# Patient Record
(demographics unavailable — no encounter records)

---

## 2018-08-27 NOTE — PDOC
*Physical Exam





- Vital Signs


 Last Vital Signs











Temp Pulse Resp BP Pulse Ox


 


 100.1 F H  120 H  18   123/67   100 


 


 08/27/18 15:54  08/27/18 15:54  08/27/18 15:54  08/27/18 15:54  08/27/18 15:54














ED Treatment Course





- LABORATORY


CBC & Chemistry Diagram: 


 08/27/18 16:07





 08/27/18 16:07





- ADDITIONAL ORDERS


Additional order review: 


 Laboratory  Results











  08/27/18 08/27/18





  17:06 16:07


 


Sodium   140


 


Potassium   4.1


 


Chloride   105


 


Carbon Dioxide   26


 


Anion Gap   9


 


BUN   7


 


Creatinine   0.6


 


Creat Clearance w eGFR   No Result Required.


 


Random Glucose   115 H


 


Calcium   9.8


 


Total Bilirubin   0.6


 


AST   19


 


ALT   27


 


Alkaline Phosphatase   175 H


 


Total Protein   8.0


 


Albumin   3.9


 


Urine Color  Straw 


 


Urine Appearance  Clear 


 


Urine pH  6.0 


 


Ur Specific Gravity  1.003 


 


Urine Protein  Negative 


 


Urine Glucose (UA)  Negative 


 


Urine Ketones  Negative 


 


Urine Blood  3+ H 


 


Urine Nitrite  Negative 


 


Urine Bilirubin  Negative 


 


Urine Urobilinogen  Negative 


 


Ur Leukocyte Esterase  Negative 


 


Urine WBC (Auto)  2 


 


Urine RBC (Auto)  1 


 


Ur Epithelial Cells  Rare 


 


Urine HCG, Qual  Negative 








 











  08/27/18





  16:07


 


RBC  4.69


 


MCV  78.5


 


MCHC  33.0


 


RDW  16.2 H


 


MPV  8.3


 


Neutrophils %  84.9 H


 


Lymphocytes %  10.6


 


Monocytes %  4.1


 


Eosinophils %  0.2


 


Basophils %  0.2














- Medications


Given in the ED: 


ED Medications














Discontinued Medications














Generic Name Dose Route Start Last Admin





  Trade Name Ronnieq  PRN Reason Stop Dose Admin


 


Acetaminophen  650 mg  08/27/18 17:18  08/27/18 18:32





  Tylenol -  PO  08/27/18 17:19  650 mg





  ONCE ONE   Administration





     





     





     





     


 


Sodium Chloride  1,000 mls @ 1,000 mls/hr  08/27/18 17:17  08/27/18 18:32





  Normal Saline -  IV  08/27/18 18:16  Not Given





  ASDIR STA   





     





     





     





     














Progress Note





- Progress Note


Progress Note: 





Received sign out from BRIE Bruno.





Briefly this is a 13-year-old girl without significant medical history with 

acute lower back pain. All laboratory testing to this point has been normal and 

patient has been medicated. Plan is to reassess pain with probable discharge.





Patient is currently with a pain level 2/10. As laboratory testing is normal at 

discharge the patient home. All laboratory findings have been discussed with 

the child and the child's mother verbalized understanding of instructions. 

Mother states the child recently moved here from [place] and is in need of 

her pediatrician. I will give the child referral for Dr. Lockett.





*DC/Admit/Observation/Transfer


Diagnosis at time of Disposition: 


 Viral URI





Back pain


Qualifiers:


 Back pain location: low back pain Chronicity: acute Back pain laterality: 

midline Sciatica presence: without sciatica Qualified Code(s): M54.5 - Low back 

pain








- Discharge Dispostion


Disposition: HOME


Condition at time of disposition: Stable


Decision to Admit order: No





- Prescriptions


Prescriptions: 


Ibuprofen Oral Suspension [Motrin Oral Suspension -] 500 mg PO Q6H #140 ml





- Referrals


Referrals: 


Trace Neves MD [Staff Physician] - 





- Patient Instructions


Printed Discharge Instructions:  DI for Low Back Pain, DI for Viral Upper 

Respiratory Infection-Child


Additional Instructions: 


The cause of your child's back pain is most likely muscular.  Her labs and 

urine did not show any signs of infection.


Administer Motrin as directed for pain and/or fever, rest and hydrate as 

discussed in ED.


If symptoms worsen, return to the ER, otherwise follow-up with your pediatrician





- Post Discharge Activity

## 2018-08-27 NOTE — PDOC
Rapid Medical Evaluation


Chief Complaint: Back Pain


Time Seen by Provider: 08/27/18 15:54


Medical Evaluation: 





08/27/18 15:54


Healthy 13-year-old female with lower back pain since yesterday. Denies trauma. 

Denies urinary symptoms. Denies headache, cough, sore, throat. Reports nausea, 

fevers/chills, and bodyaches. No sick contacts.





V/s notable for oral temp 100.1, 





Alert, oriented, appears fatigued.


RRR, S1/S2, tachycardic.


Lungs CTAB.


Abdomen soft, non-tender, non-distended.


Indicates midline pain at L4/L5 area, but no midline vertebral tenderness or 

flank tenderness.





Plan:


Basic labs including CBC, CMP


UA/culture and urine pregnancy


To Main ED for further evaluation.

## 2018-10-15 NOTE — PDOC
History of Present Illness





- General


Chief Complaint: Cold Symptoms


Stated Complaint: CHEST PAIN/FEVER


Time Seen by Provider: 10/15/18 08:26


History Source: Patient, Parent(s)


Exam Limitations: No Limitations





- History of Present Illness


Initial Comments: 





10/15/18 08:35


Patient here with mother with complaints of 2-3 days of general body aches, 

chest pain, moist nonproductive cough, headache and low-grade fevers. Tmax 99.6 

and resolved with Motrin. No one else at home is sick.


Timing/Duration: reports: just prior to arrival


Severity: reports: mild, moderate


Associated Symptoms: reports: chest pain/soreness, cough, facial pain, fever/

chills, muscle aches, nasal congestion





Past History





- Travel


Traveled outside of the country in the last 30 days: No


Close contact w/someone who was outside of country & ill: No





- Past Medical History


Allergies/Adverse Reactions: 


 Allergies











Allergy/AdvReac Type Severity Reaction Status Date / Time


 


No Known Allergies Allergy   Verified 10/15/18 07:35











Home Medications: 


Ambulatory Orders





Albuterol Sulfate [Proventil HFA Inhaler -] 1 - 2 inh PO QID #1 inhaler 10/15/

18 








COPD: No





- Immunization History


Immunization Up to Date: Yes





- Suicide/Smoking/Psychosocial Hx


Smoking History: Never smoked


Have you smoked in the past 12 months: No


Information on smoking cessation initiated: No


Hx Alcohol Use: No


Drug/Substance Use Hx: No


Substance Use Type: None





**Review of Systems





- Review of Systems


Able to Perform ROS?: Yes


Is the patient limited English proficient: Yes


Constitutional: Yes: Symptoms Reported, See HPI, Fever, Loss of Appetite, 

Malaise


HEENTM: Yes: Symptoms Reported, See HPI, Nose Congestion, Throat Pain


Respiratory: Yes: See HPI, Cough (moist nonproductive)


ABD/GI: Yes: Symptoms Reported, Nausea.  No: Vomiting


Musculoskeletal: Yes: Symptoms Reported, See HPI, Muscle Pain


Integumentary: Yes: Symptoms Reported





*Physical Exam





- Vital Signs


 Last Vital Signs











Temp Pulse Resp BP Pulse Ox


 


 99.2 F   109 H  20   0/0   100 


 


 10/15/18 07:36  10/15/18 07:36  10/15/18 07:36  10/15/18 07:36  10/15/18 07:36














- Physical Exam


General Appearance: Yes: Nourished, Appropriately Dressed, Apparent Distress, 

Mild Distress


HEENT: positive: OMERO, TMs Normal (congested but landmarks easily visualized), 

Pharynx Normal, Rhinorrhea.  negative: Sinus Tenderness


Neck: positive: Supple, Lymphadenopathy (R), Lymphadenopathy (L).  negative: 

Tender


Respiratory/Chest: positive: Lungs Clear, Decreased Breath Sounds


Cardiovascular: positive: Regular Rhythm


Gastrointestinal/Abdominal: positive: Normal Bowel Sounds, Soft.  negative: 

Tender


Extremity: positive: Normal Capillary Refill, Normal Inspection, Normal Range 

of Motion


Integumentary: positive: Normal Color, Dry, Warm, Pale


Neurologic: positive: CNs II-XII NML intact, Fully Oriented, Alert, Normal Mood/

Affect, Normal Response, Motor Strength 5/5





Progress Note





- Progress Note


Progress Note: 





rapid strep test negative, some improvement after DuoNeb therefore we will 

treat with albuterol nebs and conservative measures. Patient and mother 

understand if strep results positive with microbiology will receive phone call 

and antibiotics will be prescribed.





*DC/Admit/Observation/Transfer


Diagnosis at time of Disposition: 


 URI, acute








- Discharge Dispostion


Disposition: HOME


Condition at time of disposition: Stable


Decision to Admit order: No





- Prescriptions


Prescriptions: 


Albuterol Sulfate [Proventil HFA Inhaler -] 1 - 2 inh PO QID #1 inhaler





- Referrals





- Patient Instructions


Printed Discharge Instructions:  DI for Viral Upper Respiratory Infection-Child


Additional Instructions: 


Rest, drink lots of fluids: Teas, water, soups, Pedialyte


Saltwater gargles


Steamy showers/seem to face break up mucus


Avoid contact with others until fevers and cough resolved


Lots of handwashing and good hygiene





Continue over-the-counter medications for symptomatic relief


Tylenol or Motrin for fever and pain


Proventil pumps, 2 puffs 4 times a day for the next 2 days then as needed for 

continued cough





Followup with private physician in one to 2 days as needed


Return to emergency department for worsened symptoms, fevers, dehydration





- Post Discharge Activity


Forms/Work/School Notes:  Back to School